# Patient Record
(demographics unavailable — no encounter records)

---

## 2024-10-07 NOTE — CONSULT LETTER
[Dear  ___] : Dear  [unfilled], [Consult Letter:] : I had the pleasure of evaluating your patient, [unfilled]. [Please see my note below.] : Please see my note below. [Consult Closing:] : Thank you very much for allowing me to participate in the care of this patient.  If you have any questions, please do not hesitate to contact me. [Sincerely,] : Sincerely, [FreeTextEntry3] : Amy Fung M.D. Director of Pediatric Gastroenterology and Nutrition [facility]

## 2024-10-07 NOTE — HISTORY OF PRESENT ILLNESS
[de-identified] : FOLLOWUP VISIT FOR:  Constipation and encopresis.  He has a stool every day to every other day.  He is having difficulty potty training and sometimes refuses to sit on the toilet.  He is currently not taking any medications.  There is no history of abdominal pain, vomiting or distention.    AGGRAVATING FACTORS: None  ALLEVIATING FACTORS: None  PERTINENT NEGATIVES: No fever or cough  INDEPENDENT HISTORIAN: Mother